# Patient Record
Sex: FEMALE | Race: WHITE | NOT HISPANIC OR LATINO | URBAN - METROPOLITAN AREA
[De-identification: names, ages, dates, MRNs, and addresses within clinical notes are randomized per-mention and may not be internally consistent; named-entity substitution may affect disease eponyms.]

---

## 2023-02-08 ENCOUNTER — AMBULATORY SURGICAL CENTER (OUTPATIENT)
Dept: URBAN - METROPOLITAN AREA SURGERY 12 | Facility: SURGERY | Age: 68
End: 2023-02-08
Payer: COMMERCIAL

## 2023-02-08 ENCOUNTER — OFFICE (OUTPATIENT)
Dept: URBAN - METROPOLITAN AREA PATHOLOGY 2 | Facility: PATHOLOGY | Age: 68
End: 2023-02-08

## 2023-02-08 VITALS
SYSTOLIC BLOOD PRESSURE: 161 MMHG | SYSTOLIC BLOOD PRESSURE: 168 MMHG | OXYGEN SATURATION: 96 % | RESPIRATION RATE: 13 BRPM | HEART RATE: 49 BPM | DIASTOLIC BLOOD PRESSURE: 99 MMHG | OXYGEN SATURATION: 98 % | HEART RATE: 64 BPM | DIASTOLIC BLOOD PRESSURE: 80 MMHG | RESPIRATION RATE: 15 BRPM | SYSTOLIC BLOOD PRESSURE: 139 MMHG | HEART RATE: 65 BPM | SYSTOLIC BLOOD PRESSURE: 156 MMHG | DIASTOLIC BLOOD PRESSURE: 79 MMHG | OXYGEN SATURATION: 96 % | OXYGEN SATURATION: 100 % | RESPIRATION RATE: 25 BRPM | OXYGEN SATURATION: 98 % | TEMPERATURE: 97.3 F | RESPIRATION RATE: 20 BRPM | OXYGEN SATURATION: 96 % | OXYGEN SATURATION: 94 % | RESPIRATION RATE: 6 BRPM | DIASTOLIC BLOOD PRESSURE: 56 MMHG | HEART RATE: 54 BPM | SYSTOLIC BLOOD PRESSURE: 124 MMHG | SYSTOLIC BLOOD PRESSURE: 160 MMHG | WEIGHT: 191 LBS | HEART RATE: 68 BPM | DIASTOLIC BLOOD PRESSURE: 72 MMHG | HEIGHT: 70 IN | OXYGEN SATURATION: 78 % | SYSTOLIC BLOOD PRESSURE: 153 MMHG | HEART RATE: 54 BPM | DIASTOLIC BLOOD PRESSURE: 56 MMHG | HEART RATE: 53 BPM | HEART RATE: 48 BPM | DIASTOLIC BLOOD PRESSURE: 83 MMHG | HEIGHT: 70 IN | RESPIRATION RATE: 16 BRPM | SYSTOLIC BLOOD PRESSURE: 161 MMHG | HEART RATE: 69 BPM | OXYGEN SATURATION: 94 % | DIASTOLIC BLOOD PRESSURE: 83 MMHG | SYSTOLIC BLOOD PRESSURE: 148 MMHG | SYSTOLIC BLOOD PRESSURE: 160 MMHG | RESPIRATION RATE: 16 BRPM | SYSTOLIC BLOOD PRESSURE: 153 MMHG | RESPIRATION RATE: 6 BRPM | SYSTOLIC BLOOD PRESSURE: 124 MMHG | DIASTOLIC BLOOD PRESSURE: 84 MMHG | DIASTOLIC BLOOD PRESSURE: 74 MMHG | TEMPERATURE: 97.3 F | OXYGEN SATURATION: 99 % | OXYGEN SATURATION: 85 % | DIASTOLIC BLOOD PRESSURE: 99 MMHG | HEART RATE: 53 BPM | OXYGEN SATURATION: 73 % | SYSTOLIC BLOOD PRESSURE: 156 MMHG | SYSTOLIC BLOOD PRESSURE: 186 MMHG | SYSTOLIC BLOOD PRESSURE: 148 MMHG | DIASTOLIC BLOOD PRESSURE: 85 MMHG | DIASTOLIC BLOOD PRESSURE: 84 MMHG | HEART RATE: 68 BPM | HEART RATE: 64 BPM | SYSTOLIC BLOOD PRESSURE: 186 MMHG | HEART RATE: 62 BPM | SYSTOLIC BLOOD PRESSURE: 156 MMHG | SYSTOLIC BLOOD PRESSURE: 186 MMHG | SYSTOLIC BLOOD PRESSURE: 124 MMHG | DIASTOLIC BLOOD PRESSURE: 54 MMHG | OXYGEN SATURATION: 73 % | OXYGEN SATURATION: 97 % | DIASTOLIC BLOOD PRESSURE: 60 MMHG | OXYGEN SATURATION: 78 % | DIASTOLIC BLOOD PRESSURE: 54 MMHG | DIASTOLIC BLOOD PRESSURE: 56 MMHG | DIASTOLIC BLOOD PRESSURE: 74 MMHG | DIASTOLIC BLOOD PRESSURE: 80 MMHG | DIASTOLIC BLOOD PRESSURE: 74 MMHG | RESPIRATION RATE: 17 BRPM | SYSTOLIC BLOOD PRESSURE: 168 MMHG | OXYGEN SATURATION: 92 % | DIASTOLIC BLOOD PRESSURE: 72 MMHG | OXYGEN SATURATION: 100 % | SYSTOLIC BLOOD PRESSURE: 153 MMHG | RESPIRATION RATE: 25 BRPM | OXYGEN SATURATION: 85 % | OXYGEN SATURATION: 98 % | OXYGEN SATURATION: 73 % | SYSTOLIC BLOOD PRESSURE: 172 MMHG | HEART RATE: 69 BPM | SYSTOLIC BLOOD PRESSURE: 148 MMHG | HEART RATE: 68 BPM | RESPIRATION RATE: 10 BRPM | SYSTOLIC BLOOD PRESSURE: 172 MMHG | DIASTOLIC BLOOD PRESSURE: 84 MMHG | OXYGEN SATURATION: 99 % | HEART RATE: 66 BPM | RESPIRATION RATE: 13 BRPM | OXYGEN SATURATION: 99 % | DIASTOLIC BLOOD PRESSURE: 83 MMHG | RESPIRATION RATE: 18 BRPM | RESPIRATION RATE: 11 BRPM | RESPIRATION RATE: 11 BRPM | SYSTOLIC BLOOD PRESSURE: 151 MMHG | OXYGEN SATURATION: 85 % | RESPIRATION RATE: 14 BRPM | SYSTOLIC BLOOD PRESSURE: 151 MMHG | DIASTOLIC BLOOD PRESSURE: 79 MMHG | OXYGEN SATURATION: 97 % | OXYGEN SATURATION: 94 % | RESPIRATION RATE: 6 BRPM | RESPIRATION RATE: 14 BRPM | RESPIRATION RATE: 15 BRPM | RESPIRATION RATE: 20 BRPM | DIASTOLIC BLOOD PRESSURE: 75 MMHG | HEART RATE: 62 BPM | RESPIRATION RATE: 18 BRPM | OXYGEN SATURATION: 92 % | SYSTOLIC BLOOD PRESSURE: 175 MMHG | RESPIRATION RATE: 11 BRPM | HEART RATE: 53 BPM | OXYGEN SATURATION: 78 % | DIASTOLIC BLOOD PRESSURE: 75 MMHG | SYSTOLIC BLOOD PRESSURE: 139 MMHG | HEIGHT: 70 IN | HEART RATE: 49 BPM | SYSTOLIC BLOOD PRESSURE: 160 MMHG | HEART RATE: 62 BPM | HEART RATE: 66 BPM | HEART RATE: 66 BPM | OXYGEN SATURATION: 92 % | SYSTOLIC BLOOD PRESSURE: 139 MMHG | TEMPERATURE: 97.3 F | HEART RATE: 65 BPM | DIASTOLIC BLOOD PRESSURE: 54 MMHG | RESPIRATION RATE: 20 BRPM | DIASTOLIC BLOOD PRESSURE: 99 MMHG | HEART RATE: 67 BPM | DIASTOLIC BLOOD PRESSURE: 85 MMHG | RESPIRATION RATE: 13 BRPM | RESPIRATION RATE: 15 BRPM | SYSTOLIC BLOOD PRESSURE: 151 MMHG | HEART RATE: 67 BPM | RESPIRATION RATE: 14 BRPM | RESPIRATION RATE: 10 BRPM | DIASTOLIC BLOOD PRESSURE: 75 MMHG | OXYGEN SATURATION: 100 % | SYSTOLIC BLOOD PRESSURE: 175 MMHG | SYSTOLIC BLOOD PRESSURE: 172 MMHG | HEART RATE: 65 BPM | SYSTOLIC BLOOD PRESSURE: 175 MMHG | WEIGHT: 191 LBS | DIASTOLIC BLOOD PRESSURE: 85 MMHG | RESPIRATION RATE: 17 BRPM | DIASTOLIC BLOOD PRESSURE: 72 MMHG | HEART RATE: 48 BPM | OXYGEN SATURATION: 97 % | HEART RATE: 67 BPM | WEIGHT: 191 LBS | RESPIRATION RATE: 18 BRPM | RESPIRATION RATE: 17 BRPM | HEART RATE: 48 BPM | DIASTOLIC BLOOD PRESSURE: 80 MMHG | DIASTOLIC BLOOD PRESSURE: 79 MMHG | HEART RATE: 69 BPM | RESPIRATION RATE: 16 BRPM | HEART RATE: 49 BPM | DIASTOLIC BLOOD PRESSURE: 60 MMHG | HEART RATE: 54 BPM | HEART RATE: 64 BPM | RESPIRATION RATE: 10 BRPM | SYSTOLIC BLOOD PRESSURE: 168 MMHG | SYSTOLIC BLOOD PRESSURE: 161 MMHG | DIASTOLIC BLOOD PRESSURE: 60 MMHG | RESPIRATION RATE: 25 BRPM

## 2023-02-08 DIAGNOSIS — K57.30 DIVERTICULOSIS OF LARGE INTESTINE WITHOUT PERFORATION OR ABS: ICD-10-CM

## 2023-02-08 DIAGNOSIS — K63.5 POLYP OF COLON: ICD-10-CM

## 2023-02-08 DIAGNOSIS — Z86.010 PERSONAL HISTORY OF COLONIC POLYPS: ICD-10-CM

## 2023-02-08 PROCEDURE — 45385 COLONOSCOPY W/LESION REMOVAL: CPT | Mod: PT | Performed by: INTERNAL MEDICINE

## 2023-02-08 PROCEDURE — 45380 COLONOSCOPY AND BIOPSY: CPT | Mod: 59,PT | Performed by: INTERNAL MEDICINE

## 2023-02-08 PROCEDURE — 45380 COLONOSCOPY AND BIOPSY: CPT | Mod: PT,59 | Performed by: INTERNAL MEDICINE

## 2023-02-08 PROCEDURE — 88305 TISSUE EXAM BY PATHOLOGIST: CPT | Performed by: PATHOLOGY

## 2023-06-15 ENCOUNTER — OFFICE (OUTPATIENT)
Dept: URBAN - METROPOLITAN AREA CLINIC 26 | Facility: CLINIC | Age: 68
End: 2023-06-15
Payer: COMMERCIAL

## 2023-06-15 VITALS — WEIGHT: 201 LBS | HEIGHT: 70 IN | TEMPERATURE: 98.3 F

## 2023-06-15 DIAGNOSIS — K21.9 GASTRO-ESOPHAGEAL REFLUX DISEASE WITHOUT ESOPHAGITIS: ICD-10-CM

## 2023-06-15 DIAGNOSIS — K62.5 HEMORRHAGE OF ANUS AND RECTUM: ICD-10-CM

## 2023-06-15 PROCEDURE — 99213 OFFICE O/P EST LOW 20 MIN: CPT | Performed by: INTERNAL MEDICINE

## 2024-03-11 ENCOUNTER — OFFICE VISIT (OUTPATIENT)
Dept: DERMATOLOGY | Facility: CLINIC | Age: 69
End: 2024-03-11
Payer: MEDICARE

## 2024-03-11 DIAGNOSIS — Z12.83 SKIN CANCER SCREENING: Primary | ICD-10-CM

## 2024-03-11 DIAGNOSIS — D22.9 NEVUS: ICD-10-CM

## 2024-03-11 DIAGNOSIS — L30.4 ERYTHEMA INTERTRIGO: ICD-10-CM

## 2024-03-11 DIAGNOSIS — L82.0 SEBORRHEIC KERATOSIS, INFLAMED: ICD-10-CM

## 2024-03-11 DIAGNOSIS — L82.1 SEBORRHEIC KERATOSIS: ICD-10-CM

## 2024-03-11 DIAGNOSIS — L81.4 LENTIGO: ICD-10-CM

## 2024-03-11 PROCEDURE — 99204 OFFICE O/P NEW MOD 45 MIN: CPT | Performed by: NURSE PRACTITIONER

## 2024-03-11 PROCEDURE — 1159F MED LIST DOCD IN RCRD: CPT | Performed by: NURSE PRACTITIONER

## 2024-03-11 RX ORDER — KETOCONAZOLE 20 MG/G
CREAM TOPICAL 2 TIMES DAILY
Qty: 60 G | Refills: 1 | Status: SHIPPED | OUTPATIENT
Start: 2024-03-11 | End: 2025-03-11

## 2024-03-11 RX ORDER — POTASSIUM CHLORIDE 750 MG/1
TABLET, EXTENDED RELEASE ORAL
COMMUNITY

## 2024-03-11 RX ORDER — SERTRALINE HYDROCHLORIDE 50 MG/1
50 TABLET, FILM COATED ORAL DAILY
COMMUNITY
Start: 2015-06-14

## 2024-03-11 RX ORDER — PRAVASTATIN SODIUM 20 MG/1
20 TABLET ORAL
COMMUNITY
Start: 2020-06-12

## 2024-03-11 RX ORDER — FUROSEMIDE 40 MG/1
1 TABLET ORAL
COMMUNITY
Start: 2023-10-31

## 2024-03-11 RX ORDER — EPINEPHRINE 0.22MG
AEROSOL WITH ADAPTER (ML) INHALATION EVERY 24 HOURS
COMMUNITY

## 2024-03-11 RX ORDER — OMEPRAZOLE 20 MG/1
20 CAPSULE, DELAYED RELEASE ORAL DAILY
COMMUNITY
Start: 2015-07-21

## 2024-03-11 NOTE — PROGRESS NOTES
Subjective     Kristen Patton is a 69 y.o. female who presents for the following: Skin Check.     New patient in for full body skin exam.     Review of Systems:  No other skin or systemic complaints other than what is documented elsewhere in the note.    The following portions of the chart were reviewed this encounter and updated as appropriate:       Skin Cancer History  No skin cancer on file.    Specialty Problems    None    Past Medical History:  Kristen Patton  has no past medical history on file.    Past Surgical History:  Kristen Patton  has no past surgical history on file.    Family History:  Patient family history is not on file.    Social History:  Kristen Patton  has no history on file for tobacco use, alcohol use, and drug use.    Allergies:  Patient has no known allergies.    Current Medications / CAM's:    Current Outpatient Medications:     furosemide (Lasix) 40 mg tablet, Take 1 tablet (40 mg) by mouth once daily., Disp: , Rfl:     MULTIVITAMIN ORAL, 1 tabs, ORAL, DAILY, # 100 tabs, Refill(s) 0, Date: 09/20/2023 10:07:00 EDT, Disp: , Rfl:     omeprazole (PriLOSEC) 20 mg DR capsule, Take 1 capsule (20 mg) by mouth once daily., Disp: , Rfl:     pravastatin (Pravachol) 20 mg tablet, Take 1 tablet (20 mg) by mouth once daily., Disp: , Rfl:     sertraline (Zoloft) 50 mg tablet, Take 1 tablet (50 mg) by mouth once daily., Disp: , Rfl:     coenzyme Q-10 100 mg capsule, once every 24 hours., Disp: , Rfl:     ketoconazole (NIZOral) 2 % cream, Apply topically 2 times a day., Disp: 60 g, Rfl: 1    loratadine 10 mg capsule, Take 10 mg by mouth once daily., Disp: , Rfl:     potassium chloride CR 10 mEq ER tablet, , Disp: , Rfl:      Objective   Well appearing patient in no apparent distress; mood and affect are within normal limits.    A full examination was performed including scalp, head, eyes, ears, nose, lips, neck, chest, axillae, abdomen, back, buttocks, bilateral upper extremities, bilateral lower  extremities, hands, feet, fingers, toes, fingernails, and toenails. All findings within normal limits unless otherwise noted below.    Assessment/Plan   1. Skin cancer screening    The patient presented for a routine skin examination today. There are no specific concerns regarding skin health and no new or changing moles, lesions, or rashes.     Assessment: Based on the comprehensive skin examination, there were no concerning or abnormal findings. The patient's skin appeared to be in good health, without any notable dermatologic conditions or lesions.    Plan: Given the absence of any significant skin findings, no specific interventions or treatments are warranted at this time. The patient was educated on the importance of regular skin self-examinations and advised to promptly report any changes or concerns. Routine follow-up for a skin examination was recommended.    -These lesions have benign, reassuring patterns on dermoscopy.  -There were no concerning features found on exam today.  -Recommend continued self-observation, and to contact the office if any changes in nevi are  noticed.    Discussed/information given on safe sun practices and use of sunscreen, sun protective clothing or sun avoidance. Recommend to use OTC medication of sunscreen SPF 30 or higher on a daily basis prior to sun exposure to reduce the risk of skin cancer.    Contact Office if: Any lesions change in size, shape or color; itch, bum or bleed.         Related Procedures  Follow Up In Dermatology - Established Patient    2. Nevus  Multiple benign appearing flesh colored to pigmented macules and papules     Plan: Counseling.  I counseled the patient regarding the following:  Instructions: Monthly self-skin checks to monitor for any changes in moles are recommended. Expectations: Benign Nevi are pigmented nests of cells within the skin.No treatment is necessary. Contact Office if: Any moles change in size, shape or color; itch, bum or  "bleed.    3. Lentigo  Scattered tan macules in sun-exposed areas.    Solar lentigo (a type of lentigo also known as a senile lentigo, age spot, or liver spot) is a benign pigmented macule appearing on fair-skinned individuals that is related to ultraviolet radiation (UVR) exposure, typically from the sun.     PLAN:  Limiting sun exposure through avoidance, protective clothing, and use of sunscreens can help prevent the appearance of solar lentigines.    If lesion changes or becomes symptomatic she should return to clinic    4. Seborrheic keratosis    Seborrheic keratoses (SKs) are extremely common benign neoplasms of the skin. There can be few or hundreds of these raised, \"stuck-on\"-appearing papules and plaques with well-defined borders. The cause is unknown, although there is a familial trait for the development of multiple SKs.      SKs tend to increase in incidence and number with increasing age.     Skin Care: Seborrheic Keratoses are benign. No treatment is necessary.    Patient was instructed to call the office if any lesions become irritated or inflamed        5. Erythema intertrigo  Pubic; Left Inframammary Fold; Right Inframammary Fold    Intertrigo is any infectious or noninfectious inflammatory condition of two closely opposed skin surfaces (intertriginous area). The warm, moist environment of the skin folds is ideal for maintaining this condition. Constant rubbing (friction) can also worsen the condition.      Risk factors for this condition include: Obesity, clothing (especially tight-fitting clothing) that chafes skin, activities that promote skin-on-skin rubbing, sweating, incontinence, occupational exposure to moisture, such as wearing rubber gloves    Skin care tips:       After antifungal treatment, initiate skin care with drying agents to reduce risk of recurrence. Use of over-the-counter drying agents such as Zeasorb, help reduce moisture.        If incontinent, a structured skin care routine " and the use of absorbent hygiene products (adult briefs, underwear liners) can help reduce skin breakdown and reduce the risk of intertrigo.       Wear properly fitting clothes.     PLAN:  Start ketoconazole 2% cream twice daily     Related Medications  ketoconazole (NIZOral) 2 % cream  Apply topically 2 times a day.    6. Seborrheic keratosis, inflamed (2)  Left Flank; Right Flank

## 2025-03-13 ENCOUNTER — APPOINTMENT (OUTPATIENT)
Dept: DERMATOLOGY | Facility: CLINIC | Age: 70
End: 2025-03-13
Payer: MEDICARE

## 2025-03-13 DIAGNOSIS — L82.1 SEBORRHEIC KERATOSIS: ICD-10-CM

## 2025-03-13 DIAGNOSIS — Z12.83 SCREENING EXAM FOR SKIN CANCER: ICD-10-CM

## 2025-03-13 DIAGNOSIS — D22.9 NEVUS: Primary | ICD-10-CM

## 2025-03-13 DIAGNOSIS — Z12.83 SKIN CANCER SCREENING: ICD-10-CM

## 2025-03-13 DIAGNOSIS — L81.4 LENTIGO: ICD-10-CM

## 2025-03-13 PROCEDURE — 1159F MED LIST DOCD IN RCRD: CPT | Performed by: NURSE PRACTITIONER

## 2025-03-13 PROCEDURE — 99213 OFFICE O/P EST LOW 20 MIN: CPT | Performed by: NURSE PRACTITIONER

## 2025-03-13 NOTE — PROGRESS NOTES
Subjective     Kristen Patton is a 70 y.o. female who presents for the following: Skin Check.   Established patient in for yearly full body skin exam.     Review of Systems:  No other skin or systemic complaints other than what is documented elsewhere in the note.    The following portions of the chart were reviewed this encounter and updated as appropriate:       Skin Cancer History  No skin cancer on file.    Specialty Problems    None    Past Medical History:  Kristen Patton  has no past medical history on file.    Past Surgical History:  Kristen Patton  has no past surgical history on file.    Family History:  Patient family history is not on file.    Social History:  Kristen Patton  has no history on file for tobacco use, alcohol use, and drug use.    Allergies:  Patient has no known allergies.    Current Medications / CAM's:    Current Outpatient Medications:     coenzyme Q-10 100 mg capsule, once every 24 hours., Disp: , Rfl:     furosemide (Lasix) 40 mg tablet, Take 1 tablet (40 mg) by mouth once daily., Disp: , Rfl:     loratadine 10 mg capsule, Take 10 mg by mouth once daily., Disp: , Rfl:     MULTIVITAMIN ORAL, 1 tabs, ORAL, DAILY, # 100 tabs, Refill(s) 0, Date: 09/20/2023 10:07:00 EDT, Disp: , Rfl:     omeprazole (PriLOSEC) 20 mg DR capsule, Take 1 capsule (20 mg) by mouth once daily., Disp: , Rfl:     potassium chloride CR 10 mEq ER tablet, , Disp: , Rfl:     pravastatin (Pravachol) 20 mg tablet, Take 1 tablet (20 mg) by mouth once daily., Disp: , Rfl:     sertraline (Zoloft) 50 mg tablet, Take 1 tablet (50 mg) by mouth once daily., Disp: , Rfl:      Objective   Well appearing patient in no apparent distress; mood and affect are within normal limits.      Assessment/Plan   1. Nevus  Uniform pigmented macule(s)/papule(s) with reassuring findings on dermoscopy    -Discussed nature of condition  -Reassurance, benign-appearing features on examination today  -Recommend continued observation    2. Skin cancer  screening    Related Procedures  Follow Up In Dermatology - Established Patient    3. Lentigo  Tan macules    -Benign appearing on exam  -Reassurance, recommend observation    4. Seborrheic keratosis  Stuck on, waxy macule(s)/papule(s)/plaque(s) with comedo-like openings and milia like cysts    -Discussed nature of condition  -Reassurance, recommend continued observation    5. Screening exam for skin cancer

## 2026-03-19 ENCOUNTER — APPOINTMENT (OUTPATIENT)
Dept: DERMATOLOGY | Facility: CLINIC | Age: 71
End: 2026-03-19
Payer: MEDICARE